# Patient Record
Sex: MALE | Race: WHITE | NOT HISPANIC OR LATINO | URBAN - METROPOLITAN AREA
[De-identification: names, ages, dates, MRNs, and addresses within clinical notes are randomized per-mention and may not be internally consistent; named-entity substitution may affect disease eponyms.]

---

## 2019-05-23 ENCOUNTER — OUTPATIENT (OUTPATIENT)
Dept: OUTPATIENT SERVICES | Facility: HOSPITAL | Age: 44
LOS: 1 days | End: 2019-05-23
Payer: COMMERCIAL

## 2019-05-23 VITALS
OXYGEN SATURATION: 98 % | WEIGHT: 151.9 LBS | RESPIRATION RATE: 16 BRPM | DIASTOLIC BLOOD PRESSURE: 78 MMHG | TEMPERATURE: 98 F | SYSTOLIC BLOOD PRESSURE: 125 MMHG | HEART RATE: 64 BPM

## 2019-05-23 DIAGNOSIS — Z90.49 ACQUIRED ABSENCE OF OTHER SPECIFIED PARTS OF DIGESTIVE TRACT: Chronic | ICD-10-CM

## 2019-05-23 DIAGNOSIS — K40.90 UNILATERAL INGUINAL HERNIA, WITHOUT OBSTRUCTION OR GANGRENE, NOT SPECIFIED AS RECURRENT: ICD-10-CM

## 2019-05-23 DIAGNOSIS — Z98.890 OTHER SPECIFIED POSTPROCEDURAL STATES: Chronic | ICD-10-CM

## 2019-05-23 DIAGNOSIS — Z01.818 ENCOUNTER FOR OTHER PREPROCEDURAL EXAMINATION: ICD-10-CM

## 2019-05-23 LAB
APPEARANCE UR: CLEAR — SIGNIFICANT CHANGE UP
BILIRUB UR-MCNC: ABNORMAL
COLOR SPEC: YELLOW — SIGNIFICANT CHANGE UP
DIFF PNL FLD: ABNORMAL
GLUCOSE UR QL: NEGATIVE — SIGNIFICANT CHANGE UP
HCT VFR BLD CALC: 45.9 % — SIGNIFICANT CHANGE UP (ref 39–50)
HGB BLD-MCNC: 15.1 G/DL — SIGNIFICANT CHANGE UP (ref 13–17)
KETONES UR-MCNC: ABNORMAL
LEUKOCYTE ESTERASE UR-ACNC: NEGATIVE — SIGNIFICANT CHANGE UP
MCHC RBC-ENTMCNC: 28.4 PG — SIGNIFICANT CHANGE UP (ref 27–34)
MCHC RBC-ENTMCNC: 32.9 GM/DL — SIGNIFICANT CHANGE UP (ref 32–36)
MCV RBC AUTO: 86.4 FL — SIGNIFICANT CHANGE UP (ref 80–100)
NITRITE UR-MCNC: POSITIVE
NRBC # BLD: 0 /100 WBCS — SIGNIFICANT CHANGE UP (ref 0–0)
PH UR: 5 — SIGNIFICANT CHANGE UP (ref 5–8)
PLATELET # BLD AUTO: 253 K/UL — SIGNIFICANT CHANGE UP (ref 150–400)
PROT UR-MCNC: 25 MG/DL
RBC # BLD: 5.31 M/UL — SIGNIFICANT CHANGE UP (ref 4.2–5.8)
RBC # FLD: 12.5 % — SIGNIFICANT CHANGE UP (ref 10.3–14.5)
SP GR SPEC: 1.03 — HIGH (ref 1.01–1.02)
UROBILINOGEN FLD QL: 1
WBC # BLD: 8.69 K/UL — SIGNIFICANT CHANGE UP (ref 3.8–10.5)
WBC # FLD AUTO: 8.69 K/UL — SIGNIFICANT CHANGE UP (ref 3.8–10.5)

## 2019-05-23 PROCEDURE — G0463: CPT

## 2019-05-23 PROCEDURE — 87086 URINE CULTURE/COLONY COUNT: CPT

## 2019-05-23 PROCEDURE — 81001 URINALYSIS AUTO W/SCOPE: CPT

## 2019-05-23 PROCEDURE — 36415 COLL VENOUS BLD VENIPUNCTURE: CPT

## 2019-05-23 PROCEDURE — 85027 COMPLETE CBC AUTOMATED: CPT

## 2019-05-23 NOTE — H&P PST ADULT - NEGATIVE GASTROINTESTINAL SYMPTOMS
no constipation/no diarrhea/no change in bowel habits/no vomiting/no abdominal pain/no steatorrhea/no melena/no hematochezia/no jaundice/no hiccoughs/no nausea

## 2019-05-23 NOTE — H&P PST ADULT - GASTROINTESTINAL DETAILS
no rebound tenderness/no rigidity/no organomegaly/no guarding/soft/normal/no masses palpable/bowel sounds normal/no distention/no bruit/nontender

## 2019-05-23 NOTE — H&P PST ADULT - NEGATIVE CARDIOVASCULAR SYMPTOMS
no dyspnea on exertion/no orthopnea/no peripheral edema/no palpitations/no claudication/no chest pain/no paroxysmal nocturnal dyspnea

## 2019-05-23 NOTE — H&P PST ADULT - LYMPHATIC
anterior cervical L/posterior cervical L/supraclavicular L/posterior cervical R/anterior cervical R/supraclavicular R

## 2019-05-23 NOTE — H&P PST ADULT - NSICDXPASTSURGICALHX_GEN_ALL_CORE_FT
PAST SURGICAL HISTORY:  History of repair of pyloric stenosis (As an infant)    S/P colon resection (2004)    S/P colonoscopy     S/P endoscopy

## 2019-05-23 NOTE — H&P PST ADULT - NSICDXPASTMEDICALHX_GEN_ALL_CORE_FT
PAST MEDICAL HISTORY:  H/O Crohn's disease (Last seen by GI 10 years ago) PAST MEDICAL HISTORY:  H/O Crohn's disease (Last seen by GI 10 years ago)    Unilateral inguinal hernia, without obstruction or gangrene, not specified as recurrent PAST MEDICAL HISTORY:  H/O Crohn's disease (Last seen by GI in 2009)    Unilateral inguinal hernia, without obstruction or gangrene, not specified as recurrent

## 2019-05-23 NOTE — H&P PST ADULT - ASSESSMENT
43 yo male with unilateral inguinal hernia, without obstruction or gangrene, not specified as recurrent.

## 2019-05-23 NOTE — H&P PST ADULT - HISTORY OF PRESENT ILLNESS
45 yo male with PMH of Crohn's (stable) here for PST. Pt diagnosed with left inguinal hernia a few years ago. Pt reports to having chronic pain of left groin and rates pain to be 4/10 but pt denies take any po analgesia. Pt denies n/v/d and constipation. Pt electing for open repair incarcerated left inguinal hernia with pain pump on 5/24/19. 45 yo male with PMH of Crohn's (stable) here for PST. Pt diagnosed with left inguinal hernia about 3 years ago. Pt reports to having chronic pain of left groin getting worse recently and rates pain to be 4/10 but pt denies take any po analgesia. Pt denies n/v/d and constipation. Pt electing for open repair incarcerated left inguinal hernia with pain pump on 5/24/19.

## 2019-05-23 NOTE — H&P PST ADULT - NSANTHOSAYNRD_GEN_A_CORE
No. PRASHANT screening performed.  STOP BANG Legend: 0-2 = LOW Risk; 3-4 = INTERMEDIATE Risk; 5-8 = HIGH Risk

## 2019-05-23 NOTE — H&P PST ADULT - NSICDXPROBLEM_GEN_ALL_CORE_FT
PROBLEM DIAGNOSES  Problem: Preoperative testing  Assessment and Plan: No medical clearance needed as per surgeon. CBC, UA and Urine culture ordered. Pre-op instructions and surgical scrubs given and pt verbalized understanding.      Problem: Unilateral inguinal hernia, without obstruction or gangrene, not specified as recurrent  Assessment and Plan: Open repair incarcerated left inguinal hernia with pain pump on 5/24/19.

## 2019-05-24 ENCOUNTER — OUTPATIENT (OUTPATIENT)
Dept: OUTPATIENT SERVICES | Facility: HOSPITAL | Age: 44
LOS: 1 days | End: 2019-05-24
Payer: COMMERCIAL

## 2019-05-24 VITALS
WEIGHT: 151.9 LBS | TEMPERATURE: 98 F | RESPIRATION RATE: 14 BRPM | SYSTOLIC BLOOD PRESSURE: 131 MMHG | DIASTOLIC BLOOD PRESSURE: 88 MMHG | OXYGEN SATURATION: 94 % | HEART RATE: 68 BPM

## 2019-05-24 VITALS
SYSTOLIC BLOOD PRESSURE: 110 MMHG | HEART RATE: 76 BPM | RESPIRATION RATE: 15 BRPM | DIASTOLIC BLOOD PRESSURE: 74 MMHG | OXYGEN SATURATION: 98 %

## 2019-05-24 DIAGNOSIS — K40.90 UNILATERAL INGUINAL HERNIA, WITHOUT OBSTRUCTION OR GANGRENE, NOT SPECIFIED AS RECURRENT: ICD-10-CM

## 2019-05-24 DIAGNOSIS — Z98.890 OTHER SPECIFIED POSTPROCEDURAL STATES: Chronic | ICD-10-CM

## 2019-05-24 DIAGNOSIS — Z90.49 ACQUIRED ABSENCE OF OTHER SPECIFIED PARTS OF DIGESTIVE TRACT: Chronic | ICD-10-CM

## 2019-05-24 LAB
CULTURE RESULTS: SIGNIFICANT CHANGE UP
SPECIMEN SOURCE: SIGNIFICANT CHANGE UP

## 2019-05-24 PROCEDURE — 88302 TISSUE EXAM BY PATHOLOGIST: CPT | Mod: 26

## 2019-05-24 PROCEDURE — 49507 PRP I/HERN INIT BLOCK >5 YR: CPT | Mod: AS

## 2019-05-24 PROCEDURE — 49507 PRP I/HERN INIT BLOCK >5 YR: CPT | Mod: LT

## 2019-05-24 PROCEDURE — C1781: CPT

## 2019-05-24 RX ORDER — SODIUM CHLORIDE 9 MG/ML
1000 INJECTION, SOLUTION INTRAVENOUS
Refills: 0 | Status: DISCONTINUED | OUTPATIENT
Start: 2019-05-24 | End: 2019-05-24

## 2019-05-24 RX ORDER — BUPIVACAINE HCL/PF 7.5 MG/ML
270 VIAL (ML) INJECTION
Refills: 0 | Status: DISCONTINUED | OUTPATIENT
Start: 2019-05-24 | End: 2019-05-24

## 2019-05-24 RX ORDER — CEFAZOLIN SODIUM 1 G
2000 VIAL (EA) INJECTION ONCE
Refills: 0 | Status: COMPLETED | OUTPATIENT
Start: 2019-05-24 | End: 2019-05-24

## 2019-05-24 RX ORDER — DOCUSATE SODIUM 100 MG
1 CAPSULE ORAL
Qty: 20 | Refills: 0
Start: 2019-05-24 | End: 2019-06-02

## 2019-05-24 RX ORDER — IBUPROFEN 200 MG
1 TABLET ORAL
Qty: 21 | Refills: 0
Start: 2019-05-24 | End: 2019-05-30

## 2019-05-24 RX ORDER — OXYCODONE HYDROCHLORIDE 5 MG/1
5 TABLET ORAL ONCE
Refills: 0 | Status: DISCONTINUED | OUTPATIENT
Start: 2019-05-24 | End: 2019-05-24

## 2019-05-24 RX ORDER — HYDROMORPHONE HYDROCHLORIDE 2 MG/ML
0.5 INJECTION INTRAMUSCULAR; INTRAVENOUS; SUBCUTANEOUS
Refills: 0 | Status: DISCONTINUED | OUTPATIENT
Start: 2019-05-24 | End: 2019-05-24

## 2019-05-24 RX ORDER — METOCLOPRAMIDE HCL 10 MG
5 TABLET ORAL ONCE
Refills: 0 | Status: DISCONTINUED | OUTPATIENT
Start: 2019-05-24 | End: 2019-05-24

## 2019-05-24 RX ADMIN — SODIUM CHLORIDE 100 MILLILITER(S): 9 INJECTION, SOLUTION INTRAVENOUS at 17:39

## 2019-05-24 RX ADMIN — HYDROMORPHONE HYDROCHLORIDE 0.5 MILLIGRAM(S): 2 INJECTION INTRAMUSCULAR; INTRAVENOUS; SUBCUTANEOUS at 18:04

## 2019-05-24 RX ADMIN — HYDROMORPHONE HYDROCHLORIDE 0.5 MILLIGRAM(S): 2 INJECTION INTRAMUSCULAR; INTRAVENOUS; SUBCUTANEOUS at 18:18

## 2019-05-24 RX ADMIN — HYDROMORPHONE HYDROCHLORIDE 0.5 MILLIGRAM(S): 2 INJECTION INTRAMUSCULAR; INTRAVENOUS; SUBCUTANEOUS at 17:48

## 2019-05-24 RX ADMIN — HYDROMORPHONE HYDROCHLORIDE 0.5 MILLIGRAM(S): 2 INJECTION INTRAMUSCULAR; INTRAVENOUS; SUBCUTANEOUS at 17:37

## 2019-05-24 NOTE — ASU PATIENT PROFILE, ADULT - PSH
History of repair of pyloric stenosis  (As an infant)  S/P colon resection  (2004)  S/P colonoscopy    S/P endoscopy

## 2019-05-24 NOTE — ASU DISCHARGE PLAN (ADULT/PEDIATRIC) - ASU DC SPECIAL INSTRUCTIONSFT
KEEP DRESSING DRY AND CLEAN  KEEP PAIN PUMP IN PLACE  SEE DR. FRANCIS IN THE OFFICE ON TUESDAY 05/28/2019 TO REMOVE PAIN PUMP AND CHANGE DRESSING

## 2019-05-24 NOTE — ASU PATIENT PROFILE, ADULT - PMH
H/O Crohn's disease  (Last seen by GI in 2009)  Unilateral inguinal hernia, without obstruction or gangrene, not specified as recurrent

## 2019-05-24 NOTE — BRIEF OPERATIVE NOTE - NSICDXBRIEFPROCEDURE_GEN_ALL_CORE_FT
PROCEDURES:  Open repair of inguinal hernia in adult 24-May-2019 17:16:59 REPAIR INCARCERATED INGUINAL -SCROTAL HERNIA WITH MESH Marito Silverio

## 2019-05-24 NOTE — ASU DISCHARGE PLAN (ADULT/PEDIATRIC) - NURSING INSTRUCTIONS
Discharge instructions to include medications and its indications for use completed. Activity restrictions discussed. Signs/symptoms requiring immediate medical attention discussed. Infection prevention measures discussed. Patient verbalized understanding of all teachings  and assures compliance.

## 2019-05-24 NOTE — ASU DISCHARGE PLAN (ADULT/PEDIATRIC) - CARE PROVIDER_API CALL
Atul Rankin)  Surgery  [facility], [address] [place] [address]  Phone: [phone]  Fax: [phone]  Follow Up Time:

## 2019-05-24 NOTE — ASU DISCHARGE PLAN (ADULT/PEDIATRIC) - CALL YOUR DOCTOR IF YOU HAVE ANY OF THE FOLLOWING:
Bleeding that does not stop/Swelling that gets worse/Nausea and vomiting that does not stop/Unable to urinate/Pain not relieved by Medications

## 2019-05-29 LAB — SURGICAL PATHOLOGY STUDY: SIGNIFICANT CHANGE UP

## 2020-01-28 NOTE — ASU PATIENT PROFILE, ADULT - NS SC CAGE ALCOHOL CUT DOWN
no Elidel Counseling: Patient may experience a mild burning sensation during topical application. Elidel is not approved in children less than 2 years of age. There have been case reports of hematologic and skin malignancies in patients using topical calcineurin inhibitors although causality is questionable.

## 2021-07-09 NOTE — ASU PATIENT PROFILE, ADULT - NS SC CAGE ALCOHOL EYE OPENER
----- Message from Shadia Robins [facility] [place]      
Blood pressures have been improving, but are still borderline elevated. How have they been so far since the further increase in irbesartan? Part of the challenge is his hesitancy to take more medication or alternative agents. I am happy to offer him a cardiology consultation as well for which I do feel he would benefit from. Again with his cholesterol being as high as it is and its persistent elevation year to year, I would strongly encourage he reconsider medication.  
Contacted patient with results.  Verbalized understanding.     Patient does not wish to start medication at this time as patient wants to \"fix one problem at a time.\" Patient is wishing to get blood pressure under control first.      Patient stated that he has been watching his sodium in his diet. Informed patient that a low fat, low cholesterol diet is what will potentially help.    Informed patent that his cholesterol readings where elevated last year as well and was informed it was encouraged to start medication at this time as well.      Patient stated that he is getting frustrated especially with his blood pressures.  Patient stated that he thought he sent os over some pretty good readings last time.    Please advise.   
no

## 2022-10-12 NOTE — ASU PATIENT PROFILE, ADULT - NS SC CAGE ALCOHOL ANNOYED YOU
Pre-procedure checklist reviewed and pre-sedation note complete.    MD aware of maximum contrast dose of 270 mL.  no

## 2023-11-13 NOTE — ASU PREOP CHECKLIST - SITE MARKED BY SURGEON
David Mckeon at [facility] we have attempted to call pt multiple times and has not returned our call.   to [place]

## 2024-01-30 NOTE — ASU PREOP CHECKLIST - TEMPERATURE IN FAHRENHEIT (DEGREES F)
You were seen in the emergency department for nausea, vomiting, abdominal pain.  Your symptoms resolved while you are in the department and your workup was overall nonconcerning.  You should continue drinking plenty of clear fluids and taking medications as previously prescribed.  Please follow-up with your primary care provider if you are in need of more medications.  Please go to the specialist appointment in [place] as previously scheduled.    If you begin to experience worsening symptoms or unable to tolerate food or water for over 24 hours, please return to the emergency department for further evaluation.   98.2

## 2025-02-25 NOTE — H&P PST ADULT - NS PRO OT REFERRAL QUES 1 YN
"CPM/PAT Nurse Note      Name: Trent Sandhu (Trent Sandhu)  /Age: 1954/71 y.o.       Past Medical History:   Diagnosis Date    ASHD (arteriosclerotic heart disease)     drug-eluting stent placed into the circumflex artery back in May 2011.    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 Gilmore \"Stable from a cardiac standpoint for TURP 2024       No CP/anginal symptoms. No episodes of heart failure. Good functional capacity. Normal twelve-lead EKG. He may proceed with the prostate surgery at acceptable low cardiovascular risk.  He will likely need to stop his aspirin 1 week prior to the procedure & then restart postoperatively when it is felt bleeding this is acceptable.\"    H/O echocardiogram 2024    CONCLUSIONS:   1. Left ventricular ejection fraction is normal, by visual estimate at 55-60%.   2. Spectral Doppler shows an impaired relaxation pattern of left ventricular diastolic filling.   3. There is normal right ventricular global systolic function.   4. Right ventricular within normal limits.    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    Family History "   Problem Relation Name Age of Onset    Heart attack Mother Madina Sandhu     Alzheimer's disease Mother Madina Sawchik     Heart disease Mother Madina Sandhu     Cancer Mother Madina Sandhu     Hypertension Mother Madina Sandhu     Emphysema Father Narendra Sandhu     Lung disease Father Narendra Sandhu     Heart disease Maternal Grandmother Yazmin Bird        Allergies   Allergen Reactions    Sulfa (Sulfonamide Antibiotics) Headache     severe headaches       Prior to Admission medications    Medication Sig Start Date End Date Taking? Authorizing Provider   allopurinol (Zyloprim) 100 mg tablet Take 2 tablets (200 mg) by mouth once daily. 10/11/24   Historical Provider, MD   aspirin 81 mg chewable tablet Chew 1 tablet (81 mg) once daily. 10/13/11   Historical Provider, MD   atorvastatin (Lipitor) 80 mg tablet TAKE 1 TABLET DAILY 1/13/25   Nathan Gilmore DO   calcitriol (Rocaltrol) 0.25 mcg capsule Take 1 capsule (0.25 mcg) by mouth 3 (three) times a week. Mon, Wed, Fri 10/11/24   Historical Provider, MD   ciprofloxacin (Cipro) 500 mg tablet Take 1 tablet (500 mg) by mouth 2 times a day for 8 days. Start 5d before surgery and continue until catheter removed 2/12/25 2/20/25  Collin Carpenter MD   doxazosin (Cardura) 1 mg tablet Take 1 tablet (1 mg) by mouth early in the morning.. 10/10/24   Historical Provider, MD   metoprolol tartrate (Lopressor) 25 mg tablet Take 1 tablet (25 mg) by mouth 2 times a day. 7/29/24 7/29/25  Nathan Gilmore DO   nitroglycerin (Nitrostat) 0.4 mg SL tablet Place 1 tablet (0.4 mg) under the tongue every 5 minutes if needed for chest pain. 2/6/25   Nathan Gilmore DO   Synthroid 100 mcg tablet TAKE 1 TABLET ONCE DAILY ASDIRECTED 1/18/25   Leandro Puckett MD   tamsulosin (Flomax) 0.4 mg 24 hr capsule Take 1 capsule (0.4 mg) by mouth once daily. 2/4/25 2/4/26  Silviano Lindsey MD   vit C,Y-Ng-nygye-lutein-zeaxan (PreserVision AREDS-2) 250-90-40-1 mg tablet,chewable Chew 1 tablet 2 times a day.    Historical Provider,  MD FLAKO MASON     DASI Risk Score      Flowsheet Row Questionnaire Series Submission from 2/13/2025 in Virtual Care with Generic Provider Mychart Questionnaire Series Submission from 11/8/2024 in Virtual Care with Generic Provider Mychart   Can you take care of yourself (eat, dress, bathe, or use toilet)?  2.75  filed at 02/13/2025 1019 2.75  filed at 11/08/2024 1646   Can you walk indoors, such as around your house? 1.75  filed at 02/13/2025 1019 1.75  filed at 11/08/2024 1646   Can you walk a block or two on level ground?  2.75  filed at 02/13/2025 1019 2.75  filed at 11/08/2024 1646   Can you climb a flight of stairs or walk up a hill? 5.5  filed at 02/13/2025 1019 5.5  filed at 11/08/2024 1646   Can you run a short distance? 8  filed at 02/13/2025 1019 8  filed at 11/08/2024 1646   Can you do light work around the house like dusting or washing dishes? 2.7  filed at 02/13/2025 1019 2.7  filed at 11/08/2024 1646   Can you do moderate work around the house like vacuuming, sweeping floors or carrying groceries? 3.5  filed at 02/13/2025 1019 3.5  filed at 11/08/2024 1646   Can you do heavy work around the house like scrubbing floors or lifting and moving heavy furniture?  8  filed at 02/13/2025 1019 0  filed at 11/08/2024 1646   Can you do yard work like raking leaves, weeding or pushing a mower? 4.5  filed at 02/13/2025 1019 4.5  filed at 11/08/2024 1646   Can you have sexual relations? 5.25  filed at 02/13/2025 1019 0  filed at 11/08/2024 1646   Can you participate in moderate recreational activities like golf, bowling, dancing, doubles tennis or throwing a baseball or football? 6  filed at 02/13/2025 1019 6  filed at 11/08/2024 1646   Can you participate in strenous sports like swimming, singles tennis, football, basketball, or skiing? 0  filed at 02/13/2025 1019 0  filed at 11/08/2024 1646   DASI SCORE 50.7  filed at 02/13/2025 1019 37.45  filed at 11/08/2024 1646   METS Score (Will be calculated only when  all the questions are answered) 9  filed at 02/13/2025 1019 7.3  filed at 11/08/2024 1646          Caprini DVT Assessment    No data to display       Modified Frailty Index    No data to display       CHADS2 Stroke Risk  Current as of 7 minutes ago        N/A 3 to 100%: High Risk   2 to < 3%: Medium Risk   0 to < 2%: Low Risk     Last Change: N/A          This score determines the patient's risk of having a stroke if the patient has atrial fibrillation.        This score is not applicable to this patient. Components are not calculated.          Revised Cardiac Risk Index    No data to display       Apfel Simplified Score    No data to display       Risk Analysis Index Results This Encounter    No data found in the last 10 encounters.       Stop Bang Score      Flowsheet Row Questionnaire Series Submission from 2/13/2025 in Virtual Care with Generic Provider [employer] Questionnaire Series Submission from 11/8/2024 in Virtual Care with Generic Provider [employer]   Do you snore loudly? 0  filed at 02/13/2025 1019 0  filed at 11/08/2024 1646   Do you often feel tired or fatigued after your sleep? 0  filed at 02/13/2025 1019 0  filed at 11/08/2024 1646   Has anyone ever observed you stop breathing in your sleep? 0  filed at 02/13/2025 1019 0  filed at 11/08/2024 1646   Do you have or are you being treated for high blood pressure? 1  filed at 02/13/2025 1019 1  filed at 11/08/2024 1646   Recent BMI (Calculated) 24.1  filed at 02/13/2025 1019 23.2  filed at 11/08/2024 1646   Is BMI greater than 35 kg/m2? 0=No  filed at 02/13/2025 1019 0=No  filed at 11/08/2024 1646   Age older than 50 years old? 1=Yes  filed at 02/13/2025 1019 1=Yes  filed at 11/08/2024 1646   Gender - Male 1=Yes  filed at 02/13/2025 1019 1=Yes  filed at 11/08/2024 1646          Prodigy: High Risk  Total Score: 20              Prodigy Age Score      Prodigy Gender Score          ARISCAT Score for Postoperative Pulmonary Complications    No data to display        Aretha Perioperative Risk for Myocardial Infarction or Cardiac Arrest (CHET)    No data to display         Nurse Plan of Action:       RN screening call complete.  Reviewed allergies, medications and pharmacy, medical, surgical and social history with patient.  Chart updated.     no